# Patient Record
Sex: MALE | Race: WHITE | Employment: OTHER | URBAN - METROPOLITAN AREA
[De-identification: names, ages, dates, MRNs, and addresses within clinical notes are randomized per-mention and may not be internally consistent; named-entity substitution may affect disease eponyms.]

---

## 2019-01-01 ENCOUNTER — APPOINTMENT (OUTPATIENT)
Dept: GENERAL RADIOLOGY | Age: 24
End: 2019-01-01
Attending: PHYSICIAN ASSISTANT
Payer: OTHER GOVERNMENT

## 2019-01-01 ENCOUNTER — HOSPITAL ENCOUNTER (EMERGENCY)
Age: 24
Discharge: ANOTHER HOSPITAL | End: 2019-01-02
Attending: EMERGENCY MEDICINE
Payer: OTHER GOVERNMENT

## 2019-01-01 DIAGNOSIS — L03.114 CELLULITIS OF LEFT UPPER EXTREMITY: Primary | ICD-10-CM

## 2019-01-01 DIAGNOSIS — I89.1 LYMPHANGITIS: ICD-10-CM

## 2019-01-01 LAB
ALBUMIN SERPL-MCNC: 3.9 G/DL (ref 3.4–5)
ALBUMIN/GLOB SERPL: 1.1 {RATIO} (ref 0.8–1.7)
ALP SERPL-CCNC: 125 U/L (ref 45–117)
ALT SERPL-CCNC: 21 U/L (ref 16–61)
ANION GAP SERPL CALC-SCNC: 8 MMOL/L (ref 3–18)
AST SERPL-CCNC: 20 U/L (ref 15–37)
BASOPHILS # BLD: 0 K/UL (ref 0–0.1)
BASOPHILS NFR BLD: 1 % (ref 0–2)
BILIRUB SERPL-MCNC: 2.1 MG/DL (ref 0.2–1)
BUN SERPL-MCNC: 11 MG/DL (ref 7–18)
BUN/CREAT SERPL: 8
CALCIUM SERPL-MCNC: 8.7 MG/DL (ref 8.5–10.1)
CHLORIDE SERPL-SCNC: 103 MMOL/L (ref 100–108)
CK MB CFR SERPL CALC: NORMAL % (ref 0–4)
CK MB SERPL-MCNC: <1 NG/ML (ref 5–25)
CK SERPL-CCNC: 283 U/L (ref 39–308)
CO2 SERPL-SCNC: 28 MMOL/L (ref 21–32)
CREAT SERPL-MCNC: 1.3 MG/DL (ref 0.6–1.3)
DIFFERENTIAL METHOD BLD: ABNORMAL
EOSINOPHIL # BLD: 0.2 K/UL (ref 0–0.4)
EOSINOPHIL NFR BLD: 4 % (ref 0–5)
ERYTHROCYTE [DISTWIDTH] IN BLOOD BY AUTOMATED COUNT: 12.7 % (ref 11.6–14.5)
GLOBULIN SER CALC-MCNC: 3.4 G/DL (ref 2–4)
GLUCOSE SERPL-MCNC: 135 MG/DL (ref 74–99)
HCT VFR BLD AUTO: 45.9 % (ref 36–48)
HGB BLD-MCNC: 15.7 G/DL (ref 13–16)
LACTATE BLD-SCNC: 2.34 MMOL/L (ref 0.4–2)
LYMPHOCYTES # BLD: 0.6 K/UL (ref 0.9–3.6)
LYMPHOCYTES NFR BLD: 12 % (ref 21–52)
MCH RBC QN AUTO: 31.1 PG (ref 24–34)
MCHC RBC AUTO-ENTMCNC: 34.2 G/DL (ref 31–37)
MCV RBC AUTO: 90.9 FL (ref 74–97)
MONOCYTES # BLD: 0.4 K/UL (ref 0.05–1.2)
MONOCYTES NFR BLD: 8 % (ref 3–10)
NEUTS SEG # BLD: 3.8 K/UL (ref 1.8–8)
NEUTS SEG NFR BLD: 75 % (ref 40–73)
PLATELET # BLD AUTO: 169 K/UL (ref 135–420)
PMV BLD AUTO: 9.6 FL (ref 9.2–11.8)
POTASSIUM SERPL-SCNC: 3.7 MMOL/L (ref 3.5–5.5)
PROT SERPL-MCNC: 7.3 G/DL (ref 6.4–8.2)
RBC # BLD AUTO: 5.05 M/UL (ref 4.7–5.5)
SODIUM SERPL-SCNC: 139 MMOL/L (ref 136–145)
TROPONIN I SERPL-MCNC: <0.02 NG/ML (ref 0–0.04)
WBC # BLD AUTO: 5.1 K/UL (ref 4.6–13.2)

## 2019-01-01 PROCEDURE — 87040 BLOOD CULTURE FOR BACTERIA: CPT

## 2019-01-01 PROCEDURE — 82550 ASSAY OF CK (CPK): CPT

## 2019-01-01 PROCEDURE — 96375 TX/PRO/DX INJ NEW DRUG ADDON: CPT

## 2019-01-01 PROCEDURE — 85025 COMPLETE CBC W/AUTO DIFF WBC: CPT

## 2019-01-01 PROCEDURE — 99285 EMERGENCY DEPT VISIT HI MDM: CPT

## 2019-01-01 PROCEDURE — 73140 X-RAY EXAM OF FINGER(S): CPT

## 2019-01-01 PROCEDURE — 93005 ELECTROCARDIOGRAM TRACING: CPT

## 2019-01-01 PROCEDURE — 74011250636 HC RX REV CODE- 250/636: Performed by: PHYSICIAN ASSISTANT

## 2019-01-01 PROCEDURE — 83605 ASSAY OF LACTIC ACID: CPT

## 2019-01-01 PROCEDURE — 80053 COMPREHEN METABOLIC PANEL: CPT

## 2019-01-01 PROCEDURE — 96361 HYDRATE IV INFUSION ADD-ON: CPT

## 2019-01-01 PROCEDURE — 74011000250 HC RX REV CODE- 250: Performed by: PHYSICIAN ASSISTANT

## 2019-01-01 RX ORDER — VANCOMYCIN/0.9 % SOD CHLORIDE 1.5G/250ML
1500 PLASTIC BAG, INJECTION (ML) INTRAVENOUS EVERY 12 HOURS
Status: DISCONTINUED | OUTPATIENT
Start: 2019-01-02 | End: 2019-01-02 | Stop reason: HOSPADM

## 2019-01-01 RX ORDER — VANCOMYCIN 2 GRAM/500 ML IN 0.9 % SODIUM CHLORIDE INTRAVENOUS
2000 ONCE
Status: COMPLETED | OUTPATIENT
Start: 2019-01-01 | End: 2019-01-02

## 2019-01-01 RX ADMIN — CEFTRIAXONE 1 G: 1 INJECTION, POWDER, FOR SOLUTION INTRAMUSCULAR; INTRAVENOUS at 22:57

## 2019-01-01 RX ADMIN — SODIUM CHLORIDE 1000 ML: 900 INJECTION, SOLUTION INTRAVENOUS at 22:57

## 2019-01-02 VITALS
TEMPERATURE: 97.6 F | HEIGHT: 73 IN | RESPIRATION RATE: 24 BRPM | DIASTOLIC BLOOD PRESSURE: 64 MMHG | OXYGEN SATURATION: 99 % | SYSTOLIC BLOOD PRESSURE: 118 MMHG | WEIGHT: 205 LBS | BODY MASS INDEX: 27.17 KG/M2 | HEART RATE: 95 BPM

## 2019-01-02 LAB — LACTATE BLD-SCNC: 0.83 MMOL/L (ref 0.4–2)

## 2019-01-02 PROCEDURE — 96375 TX/PRO/DX INJ NEW DRUG ADDON: CPT

## 2019-01-02 PROCEDURE — 96366 THER/PROPH/DIAG IV INF ADDON: CPT

## 2019-01-02 PROCEDURE — 74011250636 HC RX REV CODE- 250/636: Performed by: PHYSICIAN ASSISTANT

## 2019-01-02 PROCEDURE — 96365 THER/PROPH/DIAG IV INF INIT: CPT

## 2019-01-02 PROCEDURE — 96361 HYDRATE IV INFUSION ADD-ON: CPT

## 2019-01-02 PROCEDURE — 83605 ASSAY OF LACTIC ACID: CPT

## 2019-01-02 RX ORDER — DIPHENHYDRAMINE HYDROCHLORIDE 50 MG/ML
25 INJECTION, SOLUTION INTRAMUSCULAR; INTRAVENOUS ONCE
Status: COMPLETED | OUTPATIENT
Start: 2019-01-02 | End: 2019-01-02

## 2019-01-02 RX ADMIN — DIPHENHYDRAMINE HYDROCHLORIDE 25 MG: 50 INJECTION INTRAMUSCULAR; INTRAVENOUS at 02:03

## 2019-01-02 RX ADMIN — VANCOMYCIN HYDROCHLORIDE 2000 MG: 10 INJECTION, POWDER, LYOPHILIZED, FOR SOLUTION INTRAVENOUS at 00:01

## 2019-01-02 RX ADMIN — SODIUM CHLORIDE 1000 ML: 900 INJECTION, SOLUTION INTRAVENOUS at 02:04

## 2019-01-02 NOTE — ED NOTES
Patient d/c to [facility] via BLS transport via [employer] in stable condition, patient reports feeling better after fluids and benadryl.

## 2019-01-02 NOTE — ED NOTES
Patient's face is noticiably swollen and patient feels face/head feels tight and is itching, vancomycin stopped and provider aware, new orders received.

## 2019-01-02 NOTE — ED TRIAGE NOTES
Triage: pt with red raised area to left 2nd finger that developed today. Finger appears swollen. Redness appears to travel up vein to axilla.  
Pt complains of pain to left chest.

## 2019-01-02 NOTE — PROGRESS NOTES
Pharmacy Dosing Services: Vancomycin Consult for Vancomycin Dosing by Pharmacy by Dr. Angela Butterfield Consult provided for this 26y.o. year old male , for indication of skin and soft tissue. Day of Therapy 1 Ht Readings from Last 1 Encounters:  
01/01/19 185.4 cm (73\") Wt Readings from Last 1 Encounters:  
01/01/19 93 kg (205 lb) Other Current Antibiotics Ceftriaxone 1 gm IV q 24 hr  
Significant Cultures Pending from micro lab Serum Creatinine Lab Results Component Value Date/Time Creatinine 1.30 01/01/2019 10:40 PM  
  
Creatinine Clearance Estimated Creatinine Clearance: 101.6 mL/min (based on SCr of 1.3 mg/dL). BUN Lab Results Component Value Date/Time BUN 11 01/01/2019 10:40 PM  
  
WBC Lab Results Component Value Date/Time WBC 5.1 01/01/2019 10:40 PM  
  
H/H Lab Results Component Value Date/Time HGB 15.7 01/01/2019 10:40 PM  
  
Platelets Lab Results Component Value Date/Time PLATELET 602 96/43/0511 10:40 PM  
  
Temp 97.8 °F (36.6 °C) Start Vancomycin therapy, with loading dose of 2000 (mg) at 2300/ 01 JAN 2019(time/date). Follow with maintenance dose of 1500(mg) at 1100/ 2 JAN 2019 (time/date), every 12 hours (frequency). Dose calculated to approximate a therapeutic trough of 10-20mcg/mL. Pharmacy to follow daily and will make changes to dose and/or frequency based on clinical status. Dosing Recommendations Vancomycin dose: 1500 mg IV Q12hrs (infused over 1.5 hrs) Estimated peak: 35.5 mcg/mL Estimated trough: 15.5 mcg/mL Estimated AUC:MADDY: 583 mcg*hr/mL (assumed MADDY 1 mcg/mL) A/P:  
1. Recommend vancomycin 1500 mg IV Q12hrs (16 mg/kg) 2. Consider a vancomycin trough level prior to the 4th dose. 3. Please monitor renal function (urine output, BUN/SCr). Dose adjustments may be necessary with a significant change in renal function.   
4. Vancomycin loading dose of 2000 mg to facilitate rapid attainment of target trough serum vancomycin levels.  
 
Pharmacist Ruby Viveros, PHARMD

## 2019-01-02 NOTE — ED PROVIDER NOTES
EMERGENCY DEPARTMENT HISTORY AND PHYSICAL EXAM 
 
Date: 1/1/2019 Patient Name: Elizabeth Pendleton History of Presenting Illness Chief Complaint Patient presents with  Rash History Provided By: Patient Chief Complaint: Redness Duration: 1 Days Timing:  Acute Location: Left second finger Severity: 6 out of 10 Associated Symptoms: left-sided chest pain onset 4 hours ago worsened with deep breaths, cough onset 1 week ago Additional History (Context):  
10:33 PM 
Elizabeth Pendleton is a 26 y.o. male with PMHX childhood asthma presents to the emergency department C/O worsening redness to the left second finger (6/10) with streaking up the left arm to the axilla onset a couple of hours ago. He noticed a scab to the dorsal aspect of the left second digit noticed last night, no known injury or trauma. Associated sxs include left-sided chest pain onset 4 hours ago worsened with deep breaths, cough onset 1 week ago. Tetanus UTD. NKDA. Pt denies fever, and any other sxs or complaints. PCP: Other, MD Abiodun 
 
 
 
Past History Past Medical History: 
History reviewed. No pertinent past medical history. Past Surgical History: 
History reviewed. No pertinent surgical history. Family History: 
History reviewed. No pertinent family history. Social History: 
Social History Tobacco Use  Smoking status: Never Smoker  Smokeless tobacco: Current User Substance Use Topics  Alcohol use: Yes Alcohol/week: 3.6 oz Types: 6 Cans of beer per week  Drug use: Not on file Allergies: Allergies Allergen Reactions  Vancomycin Rash Facial redness and itching with Vancomycin infusion. Review of Systems Review of Systems Constitutional: Negative for fever. Respiratory: Positive for cough. Cardiovascular: Positive for chest pain (left-sided). Skin: Positive for color change (redness to the left second finger with streaking up the arm to the axilla). All other systems reviewed and are negative. Physical Exam  
 
Vitals:  
 01/01/19 2220 01/01/19 2302 01/02/19 0100 BP: 139/64 130/65 118/64 Pulse: 89 79 95 Resp: 16 24 24 Temp: 97.8 °F (36.6 °C)  97.6 °F (36.4 °C) SpO2: 98% 97% 99% Weight: 93 kg (205 lb) Height: 6' 1\" (1.854 m) Physical Exam  
Constitutional: He appears well-developed and well-nourished. No distress. HENT:  
Head: Normocephalic and atraumatic. Right Ear: External ear normal.  
Left Ear: External ear normal.  
Nose: Nose normal.  
Eyes: Conjunctivae are normal.  
Neck: Normal range of motion. Cardiovascular: Normal rate, regular rhythm and normal heart sounds. Pulmonary/Chest: Effort normal and breath sounds normal. No respiratory distress. He has no wheezes. Abdominal: Soft. There is no tenderness. Neurological: He is alert. Skin: Skin is warm and dry. He is not diaphoretic. Scabbed lesion to the dorsal aspect of the proximal phalanx of the left second digit. Erythema and warmth noted to the finger and around the second and third MCP joints. Two prominent, erythematous streaks extending to the axilla noted. Psychiatric: He has a normal mood and affect. Nursing note and vitals reviewed. Diagnostic Study Results Labs: 
  
Recent Results (from the past 12 hour(s)) EKG, 12 LEAD, INITIAL Collection Time: 01/01/19 10:31 PM  
Result Value Ref Range Ventricular Rate 83 BPM  
 Atrial Rate 83 BPM  
 P-R Interval 146 ms  
 QRS Duration 104 ms Q-T Interval 362 ms QTC Calculation (Bezet) 425 ms Calculated P Axis 62 degrees Calculated R Axis 64 degrees Calculated T Axis 39 degrees Diagnosis Normal sinus rhythm Normal ECG No previous ECGs available CBC WITH AUTOMATED DIFF Collection Time: 01/01/19 10:40 PM  
Result Value Ref Range WBC 5.1 4.6 - 13.2 K/uL  
 RBC 5.05 4.70 - 5.50 M/uL  
 HGB 15.7 13.0 - 16.0 g/dL HCT 45.9 36.0 - 48.0 %  MCV 90.9 74.0 - 97.0 FL  
 MCH 31.1 24.0 - 34.0 PG  
 MCHC 34.2 31.0 - 37.0 g/dL  
 RDW 12.7 11.6 - 14.5 % PLATELET 998 691 - 178 K/uL MPV 9.6 9.2 - 11.8 FL  
 NEUTROPHILS 75 (H) 40 - 73 % LYMPHOCYTES 12 (L) 21 - 52 % MONOCYTES 8 3 - 10 % EOSINOPHILS 4 0 - 5 % BASOPHILS 1 0 - 2 %  
 ABS. NEUTROPHILS 3.8 1.8 - 8.0 K/UL  
 ABS. LYMPHOCYTES 0.6 (L) 0.9 - 3.6 K/UL  
 ABS. MONOCYTES 0.4 0.05 - 1.2 K/UL  
 ABS. EOSINOPHILS 0.2 0.0 - 0.4 K/UL  
 ABS. BASOPHILS 0.0 0.0 - 0.1 K/UL  
 DF AUTOMATED CARDIAC PANEL,(CK, CKMB & TROPONIN) Collection Time: 01/01/19 10:40 PM  
Result Value Ref Range  39 - 308 U/L  
 CK - MB <1.0 <3.6 ng/ml CK-MB Index  0.0 - 4.0 % CALCULATION NOT PERFORMED WHEN RESULT IS BELOW LINEAR LIMIT Troponin-I, QT <0.02 0.0 - 1.211 NG/ML  
METABOLIC PANEL, COMPREHENSIVE Collection Time: 01/01/19 10:40 PM  
Result Value Ref Range Sodium 139 136 - 145 mmol/L Potassium 3.7 3.5 - 5.5 mmol/L Chloride 103 100 - 108 mmol/L  
 CO2 28 21 - 32 mmol/L Anion gap 8 3.0 - 18 mmol/L Glucose 135 (H) 74 - 99 mg/dL BUN 11 7.0 - 18 MG/DL Creatinine 1.30 0.6 - 1.3 MG/DL  
 BUN/Creatinine ratio 8    
 GFR est AA >60 >60 ml/min/1.73m2 GFR est non-AA >60 >60 ml/min/1.73m2 Calcium 8.7 8.5 - 10.1 MG/DL Bilirubin, total 2.1 (H) 0.2 - 1.0 MG/DL  
 ALT (SGPT) 21 16 - 61 U/L  
 AST (SGOT) 20 15 - 37 U/L Alk. phosphatase 125 (H) 45 - 117 U/L Protein, total 7.3 6.4 - 8.2 g/dL Albumin 3.9 3.4 - 5.0 g/dL Globulin 3.4 2.0 - 4.0 g/dL A-G Ratio 1.1 0.8 - 1.7 POC LACTIC ACID Collection Time: 01/01/19 11:00 PM  
Result Value Ref Range Lactic Acid (POC) 2.34 (HH) 0.40 - 2.00 mmol/L POC LACTIC ACID Collection Time: 01/02/19  1:23 AM  
Result Value Ref Range Lactic Acid (POC) 0.83 0.40 - 2.00 mmol/L Radiologic Studies:  
 
12:16 AM 
RADIOLOGY FINDINGS Left second finger X-ray shows no obvious abnormality or bony involvement Pending review by Radiologist 
 Recorded by Sherman Mckeon ED Scribe, as dictated by Lizandro Richard PA-C 
 
XR 2ND FINGER LT MIN 2 V    (Results Pending) CT Results  (Last 48 hours) None CXR Results  (Last 48 hours) None Medical Decision Making I am the first provider for this patient. I reviewed the vital signs, available nursing notes, past medical history, past surgical history, family history and social history. Vital Signs: Reviewed the patient's vital signs. Pulse Oximetry Analysis: 98% on RA Cardiac Monitor: 
Rate: 83 bpm 
Rhythm: NSR 
 
EKG interpretation: (Preliminary) 10:31 PM  
NSR at 83 bpm; QRS duration of 104 ms; QT/QTc of 362/425 ms; Normal EKG EKG read by Lizandro Richard PA-C at 10:31 PM  
 
Records Reviewed: Nursing Notes and Old Medical Records Procedures: 
Procedures ED Course:  
10:33 PM Initial assessment performed. The patients presenting problems have been discussed, and they are in agreement with the care plan formulated and outlined with them. I have encouraged them to ask questions as they arise throughout their visit. 10:39 PM Discussed patient's history, exam, and available diagnostics results with Jaden Wilder. Ghulam Mclaughlin MD, Emergency Medicine, who agrees to see the pt. FACE-TO-FACE PROGRESS NOTE: 
11:56 PM 
The patient presents with red, swollen lesion to the left index finger with redness traveling to left axilla. My exam shows well appearing male without pulmonary sxs. Has a single lesion to his left index finger, dorsally with ascending lymphangitis quickly evolving. Imp/plan: Plan ABX and admission to [facility]. I have personally seen and examined the patient, reviewed the ALEXIS's note and agree with findings and plan. Written by Sami Elias ED Scribe, as dictated by Jaden Wilder.  Ghulam Mclaughlin MD. 
 
1:48 AM Consult: I discussed care with Internal Medicine Resident at [facility]. It was a standard discussion, including history of patients chief complaint, available diagnostic results, and treatment course. He recommends transferring the patient to their facility. 1:20 AM Per transfer center Dr. Emily Washington, Internal Medicine at [facility], who agrees to admit the pt and is the accepting physician. 
 
2:03 AM Called to room by RN who noticed patient's face appears red. Patient reports head pressure and itching to his face. Denies CP, SOB, chest tightness, throat swelling. VSS, airway patent. Vancomycin infusion stopped. Benadryl and IVF ordered. 2:24 AM Pt reassessed. Facial erythema has improved. States itching as resolved. Provider Notes (Medical Decision Making): Appears well and non-toxic, presenting with wound to left second digit with surrounding cellulitis and ascending lymphangitis. Will transfer to [facility] for continued care and IV abx. Diagnosis and Disposition TRANSFER PROGRESS NOTE: 
 
1:22 AM 
Discussed impending transfer with Patient and/or family. Pt and/or family instructed that Pt would be transferred to [facility].  Discussed reasoning for transfer and future treatment plan. Family and Pt understands and agrees with care plan. Written by JEN Kelley, as dictated by Fatuma Betts PA-C. Labs Reviewed CBC WITH AUTOMATED DIFF - Abnormal; Notable for the following components:  
    Result Value NEUTROPHILS 75 (*) LYMPHOCYTES 12 (*)   
 ABS. LYMPHOCYTES 0.6 (*) All other components within normal limits METABOLIC PANEL, COMPREHENSIVE - Abnormal; Notable for the following components:  
 Glucose 135 (*) Bilirubin, total 2.1 (*) Alk. phosphatase 125 (*) All other components within normal limits POC LACTIC ACID - Abnormal; Notable for the following components:  
 Lactic Acid (POC) 2.34 (*) All other components within normal limits CULTURE, BLOOD CULTURE, BLOOD CARDIAC PANEL,(CK, CKMB & TROPONIN) METABOLIC PANEL, COMPREHENSIVE  
 POC LACTIC ACID Recent Results (from the past 12 hour(s)) EKG, 12 LEAD, INITIAL Collection Time: 01/01/19 10:31 PM  
Result Value Ref Range Ventricular Rate 83 BPM  
 Atrial Rate 83 BPM  
 P-R Interval 146 ms  
 QRS Duration 104 ms Q-T Interval 362 ms QTC Calculation (Bezet) 425 ms Calculated P Axis 62 degrees Calculated R Axis 64 degrees Calculated T Axis 39 degrees Diagnosis Normal sinus rhythm Normal ECG No previous ECGs available CBC WITH AUTOMATED DIFF Collection Time: 01/01/19 10:40 PM  
Result Value Ref Range WBC 5.1 4.6 - 13.2 K/uL  
 RBC 5.05 4.70 - 5.50 M/uL  
 HGB 15.7 13.0 - 16.0 g/dL HCT 45.9 36.0 - 48.0 % MCV 90.9 74.0 - 97.0 FL  
 MCH 31.1 24.0 - 34.0 PG  
 MCHC 34.2 31.0 - 37.0 g/dL  
 RDW 12.7 11.6 - 14.5 % PLATELET 045 683 - 711 K/uL MPV 9.6 9.2 - 11.8 FL  
 NEUTROPHILS 75 (H) 40 - 73 % LYMPHOCYTES 12 (L) 21 - 52 % MONOCYTES 8 3 - 10 % EOSINOPHILS 4 0 - 5 % BASOPHILS 1 0 - 2 %  
 ABS. NEUTROPHILS 3.8 1.8 - 8.0 K/UL  
 ABS. LYMPHOCYTES 0.6 (L) 0.9 - 3.6 K/UL  
 ABS. MONOCYTES 0.4 0.05 - 1.2 K/UL  
 ABS. EOSINOPHILS 0.2 0.0 - 0.4 K/UL  
 ABS. BASOPHILS 0.0 0.0 - 0.1 K/UL  
 DF AUTOMATED CARDIAC PANEL,(CK, CKMB & TROPONIN) Collection Time: 01/01/19 10:40 PM  
Result Value Ref Range  39 - 308 U/L  
 CK - MB <1.0 <3.6 ng/ml CK-MB Index  0.0 - 4.0 % CALCULATION NOT PERFORMED WHEN RESULT IS BELOW LINEAR LIMIT Troponin-I, QT <0.02 0.0 - 0.253 NG/ML  
METABOLIC PANEL, COMPREHENSIVE Collection Time: 01/01/19 10:40 PM  
Result Value Ref Range Sodium 139 136 - 145 mmol/L Potassium 3.7 3.5 - 5.5 mmol/L Chloride 103 100 - 108 mmol/L  
 CO2 28 21 - 32 mmol/L Anion gap 8 3.0 - 18 mmol/L Glucose 135 (H) 74 - 99 mg/dL BUN 11 7.0 - 18 MG/DL Creatinine 1.30 0.6 - 1.3 MG/DL  
 BUN/Creatinine ratio 8    
 GFR est AA >60 >60 ml/min/1.73m2 GFR est non-AA >60 >60 ml/min/1.73m2 Calcium 8.7 8.5 - 10.1 MG/DL Bilirubin, total 2.1 (H) 0.2 - 1.0 MG/DL  
 ALT (SGPT) 21 16 - 61 U/L  
 AST (SGOT) 20 15 - 37 U/L Alk. phosphatase 125 (H) 45 - 117 U/L Protein, total 7.3 6.4 - 8.2 g/dL Albumin 3.9 3.4 - 5.0 g/dL Globulin 3.4 2.0 - 4.0 g/dL A-G Ratio 1.1 0.8 - 1.7 POC LACTIC ACID Collection Time: 01/01/19 11:00 PM  
Result Value Ref Range Lactic Acid (POC) 2.34 (HH) 0.40 - 2.00 mmol/L POC LACTIC ACID Collection Time: 01/02/19  1:23 AM  
Result Value Ref Range Lactic Acid (POC) 0.83 0.40 - 2.00 mmol/L  
 
CLINICAL IMPRESSION: 
 
1. Cellulitis of left upper extremity 2. Lymphangitis PLAN: 
1. Transfer to [facility] for service with Dr. Bolanos [facility] 
___________________________ Attestations:  
 
SCRIBE ATTESTATION: 
This note is prepared by Rosellen Goodpasture, acting as Scribe for Amanda Casper PA-C. 
 
PROVIDER ATTESTATION: 
Amanda Casper PA-C: The scribe's documentation has been prepared under my direction and personally reviewed by me in its entirety. I confirm that the note above accurately reflects all work, treatment, procedures, and medical decision making performed by me. This note is prepared by Laly Thorne, acting as Scribe for Unique Garcia MD. Unique Garcia MD:  The scribe's documentation has been prepared under my direction and personally reviewed by me in its entirety. I confirm that the note above accurately reflects all work, treatment, procedures, and medical decision making performed by me. 
___________________________

## 2019-01-07 LAB
BACTERIA SPEC CULT: NORMAL
BACTERIA SPEC CULT: NORMAL
SERVICE CMNT-IMP: NORMAL
SERVICE CMNT-IMP: NORMAL

## 2019-05-14 LAB
ATRIAL RATE: 83 BPM
CALCULATED P AXIS, ECG09: 62 DEGREES
CALCULATED R AXIS, ECG10: 64 DEGREES
CALCULATED T AXIS, ECG11: 39 DEGREES
DIAGNOSIS, 93000: NORMAL
P-R INTERVAL, ECG05: 146 MS
Q-T INTERVAL, ECG07: 362 MS
QRS DURATION, ECG06: 104 MS
QTC CALCULATION (BEZET), ECG08: 425 MS
VENTRICULAR RATE, ECG03: 83 BPM